# Patient Record
Sex: MALE | Race: WHITE
[De-identification: names, ages, dates, MRNs, and addresses within clinical notes are randomized per-mention and may not be internally consistent; named-entity substitution may affect disease eponyms.]

---

## 2020-10-14 ENCOUNTER — HOSPITAL ENCOUNTER (EMERGENCY)
Dept: HOSPITAL 88 - ER | Age: 80
Discharge: HOME | End: 2020-10-14
Payer: MEDICARE

## 2020-10-14 VITALS — BODY MASS INDEX: 31.1 KG/M2 | WEIGHT: 210 LBS | HEIGHT: 69 IN

## 2020-10-14 VITALS — SYSTOLIC BLOOD PRESSURE: 161 MMHG | DIASTOLIC BLOOD PRESSURE: 83 MMHG

## 2020-10-14 DIAGNOSIS — I25.2: ICD-10-CM

## 2020-10-14 DIAGNOSIS — I10: Primary | ICD-10-CM

## 2020-10-14 DIAGNOSIS — E78.5: ICD-10-CM

## 2020-10-14 PROCEDURE — 93005 ELECTROCARDIOGRAM TRACING: CPT

## 2020-10-14 PROCEDURE — 99282 EMERGENCY DEPT VISIT SF MDM: CPT

## 2020-10-14 NOTE — XMS REPORT
Continuity of Care Document

                             Created on: 10/14/2020



EDILSON COSBY PAUL

External Reference #: [id number]

: 1940

Sex: Male



Demographics





                          Address                   [address] [place]  [address]

 

                          Home Phone                [phone]

 

                          Preferred Language        English

 

                          Marital Status            Unknown

 

                          Quaker Affiliation     AOG

 

                          Race                      Unknown

 

                          Additional Race(s)        White



 

                          Ethnic Group              Unknown





Author





                          Author                    [facility]

t

 

                          Organization              [facility]

 

                          Address                   [address]

[place]  [address]



 

                          Phone                     Unavailable







Support





                Name            Relationship    Address         Phone

 

                Kylee Cosby ECON            Unknown         [phone]

[phone]







Care Team Providers





                    Care Team Member Name Role                Phone

 

                    Manpreet Yusuf MD PCP                 [phone]

 

                    KATHY AGEE      Attphys             Unavailable

 

                    ALEX, ROSEMARY ATWOOD Attphys             Unavailable

 

                    ALEXROSEMARY MAGANA Admphys             Unavailable







Problems





           Condition Name Condition Details Condition Category Status     Onset 

Date Resolution

Date            Last Treatment Date Treating Clinician Comments        Source

 

       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           

                  [facility]

 

       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         

                    [facility]







Allergies, Adverse Reactions, Alerts

This patient has no known allergies or adverse reactions.



Social History





           Social Habit Start Date Stop Date  Quantity   Comments   Source

 

           Sex Assigned At Birth                                             [facility]

 

           Tobacco use and exposure 2018 00:00:00 2018 00:00:00 Neve

r used            

[facility]

 

                Alcohol intake  2018 00:00:00 2018 00:00:00 Current 

drinker of 

alcohol (finding)                                   [facility]

r

 

           Alcohol Comment 2018 00:00:00 2018 00:00:00 rare         

         [facility]







                Smoking Status  Start Date      Stop Date       Source

 

                Never smoker                                    [facility]







Medications





             Ordered Medication Name Filled Medication Name Start Date   Stop Da

te    Current 

Medication? Ordering Clinician Indication Dosage     Frequency  Signature (SIG) 

Comments                  Components                Source

 

       atorvastatin (LIPITOR) 20 MG tablet        2018 12:37:40        Yes

                  20mg   QD     Take

20 mg by mouth daily.                                         [facility]

 

           levothyroxine (SYNTHROID, LEVOTHROID) 75 MCG tablet            2018 12:37:40            Yes        

                    75ug                Take 75 mcg by mouth Every morning on an

 empty stomach.                     [facility]

 

       carvedilol (COREG) 25 MG tablet        2018 12:37:40        Yes    

              25mg          Take 25 

mg by mouth 2 (two) times daily with breakfast and dinner.                      

                   [facility]

 

        tamsulosin (FLOMAX) 0.4 mg Cp24 24 hr capsule         2018 12:37:4

0         Yes                     

.4mg         QD           Take 0.4 mg by mouth daily.                           

[facility]

 

       finasteride (PROSCAR) 5 mg tablet        2018 12:37:40        Yes  

                5mg    QD     Take 5 

mg by mouth daily.                                          [facility]

 

        oxybutynin (DITROPAN-XL) 10 MG 24 hr tablet         2018 12:37:40 

        Yes                     10mg

             QD           Take 10 mg by mouth daily.                           C

[facility]

 

        cholecalciferol, vitamin D3, 5,000 unit Tab         2018 12:37:40 

        Yes                     

5000U        QD           Take 5,000 Units by mouth daily.                      

     [facility]

 

       losartan (COZAAR) 50 MG tablet        2018 12:37:40        Yes     

             50mg   QD     Take 50 

mg by mouth daily.                                          [facility]







Procedures

This patient has no known procedures.



Results





           Test Description Test Time  Test Comments Results    Result Comments 

Source

 

                TISSUE EXAM     2018 17:46:00                 Surgical Pat

hology Report                     

   Case: [id number]                                 Authorizing Provider:  
Zac Luis,  Collected:           2018 1114                 
                 MD                                                             
           Ordering Location:     [facility] PERIOPERATIVE         Received:          
 2018 1226                                   SERVICES                     
                                             Pathologist:           Lisette Draper MD                                                    Specimen:    Con
dyle,Left Knee                                                                  
 LEFT KNEE BONES, EXCISION:  - PRIMARY OSTEOARTHRITIS   - SYNOVIUM WITHOUT 
SIGNIFICANT INFLAMMATION       Signing Pathologist Direct Phone Line: 
[phone] signed by Lisette Draper MD on 2018 at  
5:46 PMMO/[id number] osteoarthritis of left kneeLeft knee condyleThe 
specimen is received in a fluidless container labeled with patient information 
labeled "left knee condyle" and consists of multiple fragments of tan knee bone 
and soft tissue measuring 7 x 6 x 4 cm in aggregate. Bone fragments have 
distinct osteophyte formation with focal areas of eburnation and pitting. 
Section code: A1 and A2, bone submitted for decalcification; A3, soft tissue and
bone submitted for decalcification. CG/pl The left knee bone shows extensive 
degenerative changes with eburnated bone, sclerotic. Residual cartilages show 
fibrillation and chondrocyte clones. Features of osteophytes are noted. The soft
tissue shows some synovium, with focal associated hemosiderin deposition. 
Inflammation, atypia, or other significant changes are not noted.               

             

 

                    PHOSPHORUS          2018 10:25:00   

 

                                        Test Item

 

             PHOSPHORUS (BEAKER) (test code = 604) 2.5 mg/dL    2.3-4.7         

           





[id number] 10:25:00* 



             Test Item    Value        Reference Range Interpretation Comments

 

             MAGNESIUM (BEAKER) (test code = 627) 2.0 mg/dL    1.6-2.6          

          





BASIC METABOLIC [id number] 10:25:00* 



             Test Item    Value        Reference Range Interpretation Comments

 

             SODIUM (BEAKER) (test code = 381) 137 meq/L    136-145             

       

 

             POTASSIUM (BEAKER) (test code = 379) 3.9 meq/L    3.5-5.1          

          

 

             CHLORIDE (BEAKER) (test code = 382) 106 meq/L                

         

 

             CO2 (BEAKER) (test code = 355) 23 meq/L     22-29                  

    

 

             BLOOD UREA NITROGEN (BEAKER) (test code = 354) 18 mg/dL     7-21   

                    

 

             CREATININE (BEAKER) (test code = 358) 0.92 mg/dL   0.57-1.25       

           

 

             GLUCOSE RANDOM (BEAKER) (test code = 652) 132 mg/dL          

 H             

 

             CALCIUM (BEAKER) (test code = 697) 8.7 mg/dL    8.4-10.2           

        

 

             EGFR (BEAKER) (test code = 1092) 80 mL/min/1.73 sq m               

            ESTIMATED GFR IS NOT AS

ACCURATE AS CREATININE CLEARANCE IN PREDICTING GLOMERULAR FILTRATION RATE. 
ESTIMATED GFR IS NOT APPLICABLE FOR DIALYSIS PATIENTS.





HEPATIC FUNCTION [id number] 10:25:00* 



             Test Item    Value        Reference Range Interpretation Comments

 

             TOTAL PROTEIN (BEAKER) (test code = 770) 6.1 gm/dL    6.0-8.3      

              

 

             ALBUMIN (BEAKER) (test code = 1145) 3.6 g/dL     3.5-5.0           

         

 

             BILIRUBIN TOTAL (BEAKER) (test code = 377) 1.5 mg/dL    0.2-1.2    

  H             

 

             BILIRUBIN DIRECT (BEAKER) (test code = 706) 0.5 mg/dL    0.1-0.5   

                 

 

             ALKALINE PHOSPHATASE (BEAKER) (test code = 346) 66 U/L       

                     

 

             AST (SGOT) (BEAKER) (test code = 353) 25 U/L       5-34            

           

 

             ALT (SGPT) (BEAKER) (test code = 347) 25 U/L       6-55            

           





C-REACTIVE [id number] 10:23:00* 



             Test Item    Value        Reference Range Interpretation Comments

 

             C-REACTIVE PROTEIN (BEAKER) (test code = 676) 1.58 mg/dL   0.00-0.5

0    H             





CBC W/PLT COUNT & AUTO [id number] 09:50:00* 



             Test Item    Value        Reference Range Interpretation Comments

 

             WHITE BLOOD CELL COUNT (BEAKER) (test code = 775) 6.8 K/ L     3.5-

10.5                   

 

             RED BLOOD CELL COUNT (BEAKER) (test code = 761) 3.59 M/ L    4.63-6

.08    L             

 

             HEMOGLOBIN (BEAKER) (test code = 410) 11.8 GM/DL   13.7-17.5    L  

           

 

             HEMATOCRIT (BEAKER) (test code = 411) 34.2 %       40.1-51.0    L  

           

 

             MEAN CORPUSCULAR VOLUME (BEAKER) (test code = 753) 95.3 fL      79.

0-92.2    H             

 

             MEAN CORPUSCULAR HEMOGLOBIN (BEAKER) (test code = 751) 32.9 pg     

 25.7-32.2    H             

 

                    MEAN CORPUSCULAR HEMOGLOBIN CONC (BEAKER) (test code = 752) 

34.5 GM/DL          32.3-36.5

                                                     

 

             RED CELL DISTRIBUTION WIDTH (BEAKER) (test code = 412) 13.0 %      

 11.6-14.4                  

 

             PLATELET COUNT (BEAKER) (test code = 756) 187 K/CU MM  150-450     

               

 

             MEAN PLATELET VOLUME (BEAKER) (test code = 754) 9.3 fL       9.4-12

.4     L             

 

             NUCLEATED RED BLOOD CELLS (BEAKER) (test code = 413) 0 /100 WBC   0

-0                        

 

             NEUTROPHILS RELATIVE PERCENT (BEAKER) (test code = 429) 62 %       

                             

 

             LYMPHOCYTES RELATIVE PERCENT (BEAKER) (test code = 430) 18 %       

                             

 

             MONOCYTES RELATIVE PERCENT (BEAKER) (test code = 431) 14 %         

                           

 

             EOSINOPHILS RELATIVE PERCENT (BEAKER) (test code = 432) 5 %        

                             

 

             BASOPHILS RELATIVE PERCENT (BEAKER) (test code = 437) 1 %          

                           

 

             NEUTROPHILS ABSOLUTE COUNT (BEAKER) (test code = 670) 4.22 K/ L    

1.78-5.38                  

 

             LYMPHOCYTES ABSOLUTE COUNT (BEAKER) (test code = 414) 1.19 K/ L    

1.32-3.57    L             

 

             MONOCYTES ABSOLUTE COUNT (BEAKER) (test code = 415) 0.94 K/ L    0.

30-0.82    H             

 

             EOSINOPHILS ABSOLUTE COUNT (BEAKER) (test code = 416) 0.35 K/ L    

0.04-0.54                  

 

             BASOPHILS ABSOLUTE COUNT (BEAKER) (test code = 417) 0.05 K/ L    0.

01-0.08                  

 

             IMMATURE GRANULOCYTES-RELATIVE PERCENT (BEAKER) (test code = 2801) 

0 %          0-1                        





BASIC METABOLIC [id number] 08:39:00* 



             Test Item    Value        Reference Range Interpretation Comments

 

             SODIUM (BEAKER) (test code = 381) 137 meq/L    136-145             

       

 

             POTASSIUM (BEAKER) (test code = 379) 3.7 meq/L    3.5-5.1          

          

 

             CHLORIDE (BEAKER) (test code = 382) 109 meq/L           H    

         

 

             CO2 (BEAKER) (test code = 355) 19 meq/L     22-29        L         

    

 

             BLOOD UREA NITROGEN (BEAKER) (test code = 354) 16 mg/dL     7-21   

                    

 

             CREATININE (BEAKER) (test code = 358) 0.82 mg/dL   0.57-1.25       

           

 

             GLUCOSE RANDOM (BEAKER) (test code = 652) 138 mg/dL          

 H             

 

             CALCIUM (BEAKER) (test code = 697) 8.6 mg/dL    8.4-10.2           

        

 

             EGFR (BEAKER) (test code = 1092) 91 mL/min/1.73 sq m               

            ESTIMATED GFR IS NOT AS

ACCURATE AS CREATININE CLEARANCE IN PREDICTING GLOMERULAR FILTRATION RATE. 
ESTIMATED GFR IS NOT APPLICABLE FOR DIALYSIS PATIENTS.





HEMOGLOBIN AND [id number] 08:03:00* 



             Test Item    Value        Reference Range Interpretation Comments

 

             HEMOGLOBIN (BEAKER) (test code = 410) 13.5 GM/DL   13.7-17.5    L  

           

 

             HEMATOCRIT (BEAKER) (test code = 411) 38.7 %       40.1-51.0    L  

           





BASIC METABOLIC [id number] 09:47:00* 



             Test Item    Value        Reference Range Interpretation Comments

 

             SODIUM (BEAKER) (test code = 381) 140 meq/L    136-145             

       

 

             POTASSIUM (BEAKER) (test code = 379) 3.9 meq/L    3.5-5.1          

         Specimen slightly 

hemolyzed

 

             CHLORIDE (BEAKER) (test code = 382) 109 meq/L           H    

         

 

             CO2 (BEAKER) (test code = 355) 20 meq/L     22-29        L         

    

 

             BLOOD UREA NITROGEN (BEAKER) (test code = 354) 16 mg/dL     7-21   

                    

 

             CREATININE (BEAKER) (test code = 358) 0.97 mg/dL   0.57-1.25       

          Specimen slightly 

hemolyzed

 

             GLUCOSE RANDOM (BEAKER) (test code = 652) 100 mg/dL          

               

 

             CALCIUM (BEAKER) (test code = 697) 9.4 mg/dL    8.4-10.2           

        

 

             EGFR (BEAKER) (test code = 1092) 75 mL/min/1.73 sq m               

            ESTIMATED GFR IS NOT AS

ACCURATE AS CREATININE CLEARANCE IN PREDICTING GLOMERULAR FILTRATION RATE. 
ESTIMATED GFR IS NOT APPLICABLE FOR DIALYSIS PATIENTS.





[id number] 09:29:00* 



             Test Item    Value        Reference Range Interpretation Comments

 

             HEMOGLOBIN (BEAKER) (test code = 410) 14.9 GM/DL   13.7-17.5       

           





PLATELET [id number] 09:29:00* 



             Test Item    Value        Reference Range Interpretation Comments

 

             PLATELET COUNT (BEAKER) (test code = 756) 154 K/CU MM  150-450     

               





TISSUE [id number] 13:33:00Surgical Pathology Report                         
Case: [id number]                                 Authorizing Provider:  
Zac Luis,  Collected:           10/09/2017 Raciel RIBERA                                                             
           Ordering Location:     [facility] PERIOPERATIVE         Received:          
 10/09/2017 1450                                   SERVICES                     
                                             Pathologist:           Pancho Santa MD                                                   Specimen:    
Condyle,Right Knee                                                              
    BONE AND SOFT TISSUE, RIGHT KNEE, ARTHROPLASTY: - OSTEOARTHRITIS AND CHRONIC
SYNOVITIS       Signing Pathologist Direct Phone Line: 
[phone] signed by Pancho Santa MD on 10/17/2017 at  
1:33 [id number] osteoarthritis right knee Right knee condyle Th 
specimen is received in a fluidless container labeled with the patient's 
information and labeled "right knee condyle" and consists of multiple fragments 
of tan knee bone and soft tissue measuring 8 x 6 x 2 cm in aggregate. The bone 
fragments have distinct osteophyte formation with focal areas of eburnation.S
ection code: A1, A2, bone submitted for decalcification; A3, soft tissue and bon
e submitted for decalcification. CG/ew The synovial tissue reveals subsynovial e
eleazar with chronic inflammation.BASIC METABOLIC PANEL2017-10-10 05:31:00* 



             Test Item    Value        Reference Range Interpretation Comments

 

             SODIUM (BEAKER) (test code = 381) 139 meq/L    136-145             

       

 

             POTASSIUM (BEAKER) (test code = 379) 4.1 meq/L    3.5-5.1          

          

 

             CHLORIDE (BEAKER) (test code = 382) 111 meq/L           H    

         

 

             CO2 (BEAKER) (test code = 355) 19 meq/L     22-29        L         

    

 

             BLOOD UREA NITROGEN (BEAKER) (test code = 354) 15 mg/dL     7-21   

                    

 

             CREATININE (BEAKER) (test code = 358) 0.84 mg/dL   0.57-1.25       

           

 

             GLUCOSE RANDOM (BEAKER) (test code = 652) 146 mg/dL          

 H             

 

             CALCIUM (BEAKER) (test code = 697) 8.5 mg/dL    8.4-10.2           

        

 

             EGFR (BEAKER) (test code = 1092) 89 mL/min/1.73 sq m               

            ESTIMATED GFR IS NOT AS

ACCURATE AS CREATININE CLEARANCE IN PREDICTING GLOMERULAR FILTRATION RATE. 
ESTIMATED GFR IS NOT APPLICABLE FOR DIALYSIS PATIENTS.





HEMOGLOBIN AND HEMATOCRIT2017-10-10 05:05:00* 



             Test Item    Value        Reference Range Interpretation Comments

 

             HEMOGLOBIN (BEAKER) (test code = 410) 13.7 GM/DL   13.7-17.5       

           

 

             HEMATOCRIT (BEAKER) (test code = 411) 39.3 %       40.1-51.0    L

## 2020-10-14 NOTE — XMS REPORT
Clinical Summary

                             Created on: 10/14/2020



JeimykitaCristian

External Reference #: [id number]

: 1940

Sex: Male



Demographics





                          Address                   [address] [place]  [address]

 

                          Home Phone                [phone]

 

                          Preferred Language        English

 

                          Marital Status            Unknown

 

                          Rastafarian Affiliation     AOG

 

                          Race                      White

 

                          Ethnic Group              Non-





Author





                          Author                    [facility]

 

                          Address                   Unknown

 

                          Phone                     Unavailable







Support





                Name            Relationship    Address         Phone

 

                Kylee Cosby ECON            Unknown         [phone]







Care Team Providers





                    Care Team Member Name Role                Phone

 

                    Claude Yusuf MD PCP                 [phone]







Allergies

No Known Allergies



Medications





                          End Date                  Status



              Medication   Sig          Dispensed    Refills      Start  



                                         Date  

 

                                                    Active



                     atorvastatin (LIPITOR) 20  Take 20 mg by       0   



                           MG tablet                 mouth daily.     

 

                                                    Active



                     levothyroxine (SYNTHROID,  Take 75 mcg         0   



                           LEVOTHROID) 75 MCG tablet  by mouth     



                                         Every morning     



                                         on an empty     



                                         stomach.     

 

                                                    Active



                     carvedilol (COREG) 25 MG  Take 25 mg by       0   



                           tablet                    mouth 2 (two)     



                                         times daily     



                                         with     



                                         breakfast and     



                                         dinner.     

 

                                                    Active



                     tamsulosin (FLOMAX) 0.4  Take 0.4 mg         0   



                           mg Cp24 24 hr capsule     by mouth     



                                         daily.     

 

                                                    Active



                     finasteride (PROSCAR) 5  Take 5 mg by        0   



                           mg tablet                 mouth daily.     

 

                                                    Active



                     oxybutynin (DITROPAN-XL)  Take 10 mg by       0   



                           10 MG 24 hr tablet        mouth daily.     

 

                                                    Active



                     cholecalciferol, vitamin  Take 5,000          0   



                           D3, 5,000 unit Tab        Units by     



                                         mouth daily.     

 

                                                    Active



                     losartan (COZAAR) 50 MG  Take 50 mg by       0   



                           tablet                    mouth daily.     







Active Problems





 



                           Problem                   Noted Date

 

 



                           Primary osteoarthritis of left knee  2018

 

 



                           Left knee DJD             2018

 

 



                           Primary osteoarthritis of right knee  10/09/2017

 

 



                           Right knee DJD            10/09/2017







Social History





                                        Date



                 Tobacco Use     Types           Packs/Day       Years Used 

 

                                         



                                         Never Smoker    

 

    



                                         Smokeless Tobacco: Never   



                                         Used   







                    Drinks/Week         oz/Week             Comments



                                         Alcohol Use   

 

                                                            rare



                                         Yes   







 



                           Sex Assigned at Birth     Date Recorded

 

 



                                         Not on file 







Last Filed Vital Signs

Not on file



Plan of Treatment





Not on file



Implants





                    Device Identifier   Shelf Expiration Date Model / Serial / L

ot



                 Implanted       Type            Area            Manufactur   



                                         er   

 

                                        2019          6194-1-001 /

 /

                                        [id number]



                 Cement Bone Smplx Hv 6194-1-001 -  Cement/Ezequiel      Right: Knee 

    TERRANCE:ST   



                     [id number]           ler/Adhesi          MARCOS   



                     Implanted: Qty: 2 on 10/09/2017 by  Zac Alarcon MD at [facility]      

 

                                        2020          6194-1-001 /

 /

                                        [id number]



                 Cement Bone Smplx Hv 6194-1-001 -  Cement/Ezequiel      Left: Knee  

    TERRANCE:ST   



                     [id number]           ler/Adhesi          MARCOS   



                     Implanted: Qty: 1 on 2018 by  Zac Alarcon MD at [facility]      

 

                                        2022          5510-F-602 /

 /

[id number]



                 Comp Femoral #6 R 5510-F-602 -  Joints          Right: Knee    

 TERRANCE:ST   



                           [id number]                 MARCOS   



                           Implanted: Qty: 1 on 10/09/2017 by    Zac Evangelista MD at [facility]      

 

                                        2022          5551-L-320 /

 /

[id number]



                 Patella Triathlon Asymmetric  Joints          Right: Knee     S

TRYKER:ST   



                           5551-L-320 - [id number]    MARCOS   



                           Implanted: Qty: 1 on 10/09/2017 by    Zac Evangelista MD at [facility]      

 

                                        2022          5520-B-600 /

 /

[id number]



                 Baseplt Tri Prim Tib 6 5520-B-600 -  Joints          Right: Kne

e     TERRANCE:ST   



                           [id number]                 MARCOS   



                           Implanted: Qty: 1 on 10/09/2017 by    Zac Evangelista MD at [facility]      

 

                                        2022          5530-G-609 /

 /

                                        [id number]



                 Insrt Tib Bearing 9mm Sz 6  Joints          Right: Knee     STR

YKER:ST   



                           5530-G-609 - [id number]    MARCOS   



                           Implanted: Qty: 1 on 10/09/2017 by    Zac Evangelista MD at [facility]      

 

                                        08/15/2023          5530-G-509 /

 /

[id number]



                 Insrt Tib #5 9mm 5530-G-509 -  Joints          Left: Knee      

TERRANCE:ST   



                           [id number]                 MARCOS   



                           Implanted: Qty: 1 on 2018 by    Zac Evangelista MD at [facility]      

 

                                        2023          5551-L-350 /

 /

[id number]



                 Patella Triath Asym [id number] -  Joints          Left: Knee      

TERRANCE:ST   



                           [id number]                 MARCOS   



                           Implanted: Qty: 1 on 2018 by    Zac Evangelista MD at [facility]      

 

                                        2023          5510-F-501 /

 /

[id number]



                 Comp Fem Cr Sidney No.5 L 5510-F-501 -  Joints          Left: Knee

      TERRANCE:ST   



                           [id number]                 MARCOS   



                           Implanted: Qty: 1 on 2018 by    Zac Evangelista MD at [facility]      

 

                                        2023          5520-B-500 /

 /

[id number]



                 Baseplt Symone Tib Sidney No.5 5520-B-500  Joints          Left: Knee

      TERRANCE:ST   



                           - [id number]               MARCOS   



                           Implanted: Qty: 1 on 2018 by    Zac Evangelista MD at [facility]      







Results

Not on fileafter 10/14/2019



Insurance





                                        Type



            Payer      Benefit    Subscriber ID  Effective  Phone      Address 



                           Plan /                    Dates   



                                         Group     

 

                                        HMO/POS



                 [employer]  [employer]           [id number]     3/1/2017-P   



                           HMO/POS/OP                resent   



                                         EN ACCESS     

 

                                         



                 [facility]      [id number]     2017-P   



                           MEDICARE                  resent   



                                         ADV     







     



            Guarantor Name  Account    Relation to  Date of    Phone      Thien lora Address



                     Type                Patient             Birth  

 

     



            Cristian Cosby  Personal/F  Self       1940  [phone]  [address]               ([place])              [place] [address]

[address]







Advance Directives





For more information, please contact: [phone]







                          Date Inactivated          Comments



                           Code Status               Date Activated  

 

                          2018  1:47 PM        



                           Full Code                 2018  8:33 AM  







  



                           This code status was determined by:  Patient 







                                                     

 

                          10/9/2017  5:46 PM         



                           Full Code                 10/9/2017 10:55 AM  







  



                           This code status was determined by:  Patient

## 2020-10-14 NOTE — EMERGENCY DEPARTMENT NOTE
History of Present Illnes


History of Present Illness


Chief Complaint:  Hypertension


History of Present Illness


This is a 80 year old  male  arrived to the ED when he noted his blood pressure 

was elevated, patient denies any complaints. Patient states he normally checks 

his blood pressure twice a day however last time he checked was a week ago and 

his blood pressures 135/70. Patient states he admits to getting aggravated with 

a water main issue and subsequently checked his blood pressure and saw was high.

.








Chief Complaint Comment 79 Y/O MALE PT AAOX3 PRESENTS TO THE ER C/O ELEVATED BP 

ONSET THIS EVENING; PT REPORTS DIZZINESS EARLIER THIS EVENING BUT HAS RESOLVED; 

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

AND TOOK LOSARTAN AND CARVEDILOL AROUND 1930; NO NEURO DEFICITS NOTED; BILATERAL

EQUAL HAND ; SYMMETRICAL SMILE; CLEAR SPEECH; PERRLA; PT DENIES CP OR SOB; 

DENIES N/V, TINNITUS, BLURRED VISION OR HA; UPRIGHT AND STEADY GAIT NOTED; RESP 

EVEN/UNLABORED;NAD NOTED AT THIS TIME EKG PERFORMED AND GIVEN TO ER MD FOR 

REVIEW.


Historian:  Patient


Arrival Mode:  Car


Onset (how long ago):  hour(s)


Radiation:  Reports non-radiation


Severity:  mild


Duration (how long):  hour(s)





Past Medical/Family History


Physician Review


I have reviewed the patient's past medical and family history.  Any updates have

been documented here.





Past Medical History


Recent Fever:  No


Clinical Suspicion of Infectio:  No


New/Unexplained Change in Ment:  No


Past Medical History:  Hypertension, MI, Hyperlipedemia


Other Medical History:  


BPH


Past Surgical History:  Knee Replacement





Review of Systems


Review of Systems


Constitutional:  Reports no symptoms


EENTM:  Reports no symptoms


Cardiovascular:  Reports no symptoms


Respiratory:  Reports no symptoms


Gastrointestinal:  Reports no symptoms


Genitourinary:  Reports no symptoms


Musculoskeletal:  Reports no symptoms


Integumentary:  Reports no symptoms


Neurological:  Reports no symptoms


Psychological:  Reports no symptoms


Endocrine:  Reports no symptoms


Hematological/Lymphatic:  Reports no symptoms





Physical Exam


Related Data


Allergies:  


Coded Allergies:  


     No Known Allergies (Unverified , 10/14/20)


Triage Vital Signs





Vital Signs








  Date Time  Temp Pulse Resp B/P (MAP) Pulse Ox O2 Delivery O2 Flow Rate FiO2


 


10/14/20 21:16 98.2 64 20 182/79 98 Room Air  








Vital signs reviewed:  Yes





Physical Exam


CONSTITUTIONAL





Constitutional:  Present well-developed, Present well-nourished


HENT


HENT:  Present normocephalic, Present atraumatic, Present oropharynx 

clear/moist, Present nose normal


HENT L/R:  Present left ext ear normal, Present right ext ear normal


EYES





Eyes:  Reports PERRL, Reports conjunctivae normal


NECK


Neck:  Present ROM normal


PULMONARY


Pulmonary:  Present effort normal, Present breath sounds normal


CARDIOVASCULAR





Cardiovascular:  Present regular rhythm, Present heart sounds normal, Present 

capillary refill normal, Present normal rate


GASTROINTESTINAL





Abdominal:  Present soft, Present nontender, Present bowel sounds normal


GENITOURINARY





Genitourinary:  Present exam deferred


SKIN


Skin:  Present warm, Present dry


MUSCULOSKELETAL





Musculoskeletal:  Present ROM normal


NEUROLOGICAL





Neurological:  Present alert, Present oriented x 3, Present no gross motor or 

sensory deficits


PSYCHOLOGICAL


Psychological:  Present mood/affect normal, Present judgement normal





Assessment & Plan


Medical Decision Making


MDM


80-year-old male arrives to the ED with no complaints except high blood 

pressure. Patient was monitored in the ED with steady improvement blood pressure

noted. No indications for medications at this time. Patient stable for discharge

.





Assessment & Plan


Final Impression:  


(1) Asymptomatic hypertension


Depart Disposition:  HOME, SELF-CARE


Last Vital Signs











  Date Time  Temp Pulse Resp B/P (MAP) Pulse Ox O2 Delivery O2 Flow Rate FiO2


 


10/14/20 21:16 98.2 64 20 182/79 98 Room Air  

















COURTNEY TRUJILLO DO            Oct 14, 2020 21:53